# Patient Record
Sex: FEMALE | Race: WHITE
[De-identification: names, ages, dates, MRNs, and addresses within clinical notes are randomized per-mention and may not be internally consistent; named-entity substitution may affect disease eponyms.]

---

## 2017-06-06 ENCOUNTER — HOSPITAL ENCOUNTER (OUTPATIENT)
Dept: HOSPITAL 62 - WI | Age: 37
End: 2017-06-06
Attending: NURSE PRACTITIONER
Payer: COMMERCIAL

## 2017-06-06 DIAGNOSIS — M84.30XA: Primary | ICD-10-CM

## 2017-06-06 PROCEDURE — 77080 DXA BONE DENSITY AXIAL: CPT

## 2017-06-06 NOTE — WOMENS IMAGING REPORT
EXAM DESCRIPTION:  BONE DENSITY HIP/SPINE



COMPLETED DATE/TIME:  6/6/2017 2:19 pm



REASON FOR STUDY:  BONE DENSITY; M84.30 M84.30XA  STRESS FRACTURE, UNSPECIFIED SITE, INIT ENCNTR FOR



COMPARISON:   None.



TECHNIQUE:  Dual-Energy X-ray Absorptiometry (DEXA) of the AP Spine and Hip.



LIMITATIONS:  None.



FINDINGS:  LUMBAR SPINE:

The bone mineral density (BMD) measured from L1-L4 in the AP projection correlates with a T-score of 
-1.1, which is osteopenic as defined by the World Health Organization.

HIP:

The bone mineral density (BMD) measured in the left femoral neck at the hip correlates with a T-score
 of -1.2, which is osteopenic as defined by the World Health Organization.



IMPRESSION:  1. LUMBAR SPINE: Osteopenic

2. HIP: Osteopenic



COMMENT:  The World Health Organization defines low BMD as follows:

T-score:

Normal:  Greater than -1.0

Osteopenia: Between -1.0 and -2.5

Osteoporosis:  Less than -2.5 without fractures

Established osteoporosis:  Less than -2.5 with fractures

In general, you may wish to consider:

Diagnosis          Treatment                     Follow-up DEXA

Normal BMD      Prevention                    2-3 years

Osteopenia       Prevention/Therapy        1-2 years

Osteoporosis     Therapy                        Yearly



TECHNICAL DOCUMENTATION:  JOB ID:  [id number]

 [employer]- All Rights Reserved

## 2019-08-23 ENCOUNTER — HOSPITAL ENCOUNTER (OUTPATIENT)
Dept: HOSPITAL 62 - OD | Age: 39
End: 2019-08-23
Attending: PHYSICIAN ASSISTANT
Payer: COMMERCIAL

## 2019-08-23 DIAGNOSIS — M25.551: Primary | ICD-10-CM

## 2019-08-23 DIAGNOSIS — M25.552: ICD-10-CM

## 2019-08-23 DIAGNOSIS — L40.9: ICD-10-CM

## 2019-08-23 LAB
ADD MANUAL DIFF: NO
ALBUMIN SERPL-MCNC: 4.4 G/DL (ref 3.5–5)
ALP SERPL-CCNC: 49 U/L (ref 38–126)
ANION GAP SERPL CALC-SCNC: 12 MMOL/L (ref 5–19)
AST SERPL-CCNC: 27 U/L (ref 14–36)
BASOPHILS # BLD AUTO: 0 10^3/UL (ref 0–0.2)
BASOPHILS NFR BLD AUTO: 0.7 % (ref 0–2)
BILIRUB DIRECT SERPL-MCNC: 0.2 MG/DL (ref 0–0.4)
BILIRUB SERPL-MCNC: 0.2 MG/DL (ref 0.2–1.3)
BUN SERPL-MCNC: 7 MG/DL (ref 7–20)
CALCIUM: 9.9 MG/DL (ref 8.4–10.2)
CHLORIDE SERPL-SCNC: 100 MMOL/L (ref 98–107)
CO2 SERPL-SCNC: 27 MMOL/L (ref 22–30)
CRP SERPL-MCNC: < 5 MG/L (ref ?–10)
EOSINOPHIL # BLD AUTO: 0.1 10^3/UL (ref 0–0.6)
EOSINOPHIL NFR BLD AUTO: 1.4 % (ref 0–6)
ERYTHROCYTE [DISTWIDTH] IN BLOOD BY AUTOMATED COUNT: 14.8 % (ref 11.5–14)
ERYTHROCYTE [SEDIMENTATION RATE] IN BLOOD: 18 MM/HR (ref 0–20)
GLUCOSE SERPL-MCNC: 93 MG/DL (ref 75–110)
HCT VFR BLD CALC: 40 % (ref 36–47)
HGB BLD-MCNC: 13.5 G/DL (ref 12–15.5)
LYMPHOCYTES # BLD AUTO: 2.4 10^3/UL (ref 0.5–4.7)
LYMPHOCYTES NFR BLD AUTO: 32.9 % (ref 13–45)
MCH RBC QN AUTO: 29 PG (ref 27–33.4)
MCHC RBC AUTO-ENTMCNC: 33.6 G/DL (ref 32–36)
MCV RBC AUTO: 86 FL (ref 80–97)
MONOCYTES # BLD AUTO: 0.5 10^3/UL (ref 0.1–1.4)
MONOCYTES NFR BLD AUTO: 7 % (ref 3–13)
NEUTROPHILS # BLD AUTO: 4.2 10^3/UL (ref 1.7–8.2)
NEUTS SEG NFR BLD AUTO: 58 % (ref 42–78)
PLATELET # BLD: 385 10^3/UL (ref 150–450)
POTASSIUM SERPL-SCNC: 4.2 MMOL/L (ref 3.6–5)
PROT SERPL-MCNC: 7 G/DL (ref 6.3–8.2)
RBC # BLD AUTO: 4.64 10^6/UL (ref 3.72–5.28)
TOTAL CELLS COUNTED % (AUTO): 100 %
WBC # BLD AUTO: 7.2 10^3/UL (ref 4–10.5)

## 2019-08-23 PROCEDURE — 86430 RHEUMATOID FACTOR TEST QUAL: CPT

## 2019-08-23 PROCEDURE — 85025 COMPLETE CBC W/AUTO DIFF WBC: CPT

## 2019-08-23 PROCEDURE — 80053 COMPREHEN METABOLIC PANEL: CPT

## 2019-08-23 PROCEDURE — 85652 RBC SED RATE AUTOMATED: CPT

## 2019-08-23 PROCEDURE — 82652 VIT D 1 25-DIHYDROXY: CPT

## 2019-08-23 PROCEDURE — 86038 ANTINUCLEAR ANTIBODIES: CPT

## 2019-08-23 PROCEDURE — 86812 HLA TYPING A B OR C: CPT

## 2019-08-23 PROCEDURE — 36415 COLL VENOUS BLD VENIPUNCTURE: CPT

## 2019-08-23 PROCEDURE — 73522 X-RAY EXAM HIPS BI 3-4 VIEWS: CPT

## 2019-08-23 PROCEDURE — 84443 ASSAY THYROID STIM HORMONE: CPT

## 2019-08-23 PROCEDURE — 86140 C-REACTIVE PROTEIN: CPT

## 2019-08-23 PROCEDURE — 82607 VITAMIN B-12: CPT

## 2019-08-23 NOTE — RADIOLOGY REPORT (SQ)
EXAM DESCRIPTION:  HIPS BILATERAL



COMPLETED DATE/TIME:  8/23/2019 11:22 am



REASON FOR STUDY:  NATALIYA HIP PAIN M25.559  PAIN IN UNSPECIFIED HIP G89.4  CHRONIC PAIN SYNDROME L40.9  
PSORIASIS, UNSPECIFIED



COMPARISON:  None.



NUMBER OF VIEWS:  Two views



TECHNIQUE:  AP pelvis and additional frog-leg view of both hips.



LIMITATIONS:  None.



FINDINGS:  MINERALIZATION: Normal.

HIPS: No acute fracture or dislocation. No worrisome bone lesions.

PELVIS AND SACRUM:  No acute fracture or dislocation. No worrisome bone lesions.

PUBIS AND ISCHIUM: No acute fracture.

LOWER LUMBAR SPINE: No significant findings as visualized.

SOFT TISSUES: No findings.

OTHER: No other significant finding.



IMPRESSION:  NEGATIVE STUDY OF THE PELVIS AND HIPS.



TECHNICAL DOCUMENTATION:  JOB ID:  [id number]

 2011 [employer]- All Rights Reserved



Reading location - IP/workstation name: [email or web address]

## 2020-02-03 ENCOUNTER — HOSPITAL ENCOUNTER (OUTPATIENT)
Dept: HOSPITAL 62 - LC | Age: 40
Discharge: HOME | End: 2020-02-03
Attending: OBSTETRICS & GYNECOLOGY
Payer: COMMERCIAL

## 2020-02-03 DIAGNOSIS — Z3A.25: ICD-10-CM

## 2020-02-03 DIAGNOSIS — O21.0: Primary | ICD-10-CM

## 2020-02-03 LAB
APPEARANCE UR: CLEAR
APTT PPP: (no result) S
BARBITURATES UR QL SCN: NEGATIVE
BILIRUB UR QL STRIP: NEGATIVE
GLUCOSE UR STRIP-MCNC: NEGATIVE MG/DL
KETONES UR STRIP-MCNC: NEGATIVE MG/DL
METHADONE UR QL SCN: NEGATIVE
NITRITE UR QL STRIP: NEGATIVE
PCP UR QL SCN: NEGATIVE
PH UR STRIP: 7 [PH] (ref 5–9)
PROT UR STRIP-MCNC: NEGATIVE MG/DL
SP GR UR STRIP: 1
URINE AMPHETAMINES SCREEN: NEGATIVE
URINE BENZODIAZEPINES SCREEN: NEGATIVE
URINE COCAINE SCREEN: NEGATIVE
URINE MARIJUANA (THC) SCREEN: NEGATIVE
UROBILINOGEN UR-MCNC: NEGATIVE MG/DL (ref ?–2)

## 2020-02-03 PROCEDURE — 80307 DRUG TEST PRSMV CHEM ANLYZR: CPT

## 2020-02-03 PROCEDURE — 4A1HXCZ MONITORING OF PRODUCTS OF CONCEPTION, CARDIAC RATE, EXTERNAL APPROACH: ICD-10-PCS | Performed by: OBSTETRICS & GYNECOLOGY

## 2020-02-03 PROCEDURE — 81001 URINALYSIS AUTO W/SCOPE: CPT

## 2020-02-03 PROCEDURE — 59899 UNLISTED PX MAT CARE&DLVR: CPT

## 2020-02-03 PROCEDURE — G0480 DRUG TEST DEF 1-7 CLASSES: HCPCS

## 2020-02-03 PROCEDURE — 80349 CANNABINOIDS NATURAL: CPT

## 2020-05-04 LAB
ADD MANUAL DIFF: NO
APPEARANCE UR: CLEAR
APTT PPP: (no result) S
BARBITURATES UR QL SCN: NEGATIVE
BASOPHILS # BLD AUTO: 0 10^3/UL (ref 0–0.2)
BASOPHILS NFR BLD AUTO: 0.3 % (ref 0–2)
BILIRUB UR QL STRIP: NEGATIVE
EOSINOPHIL # BLD AUTO: 0 10^3/UL (ref 0–0.6)
EOSINOPHIL NFR BLD AUTO: 0.4 % (ref 0–6)
ERYTHROCYTE [DISTWIDTH] IN BLOOD BY AUTOMATED COUNT: 18.7 % (ref 11.5–14)
GLUCOSE UR STRIP-MCNC: NEGATIVE MG/DL
HCT VFR BLD CALC: 35.4 % (ref 36–47)
HGB BLD-MCNC: 12.1 G/DL (ref 12–15.5)
KETONES UR STRIP-MCNC: NEGATIVE MG/DL
LYMPHOCYTES # BLD AUTO: 1.9 10^3/UL (ref 0.5–4.7)
LYMPHOCYTES NFR BLD AUTO: 18.5 % (ref 13–45)
MCH RBC QN AUTO: 29.7 PG (ref 27–33.4)
MCHC RBC AUTO-ENTMCNC: 34.1 G/DL (ref 32–36)
MCV RBC AUTO: 87 FL (ref 80–97)
METHADONE UR QL SCN: NEGATIVE
MONOCYTES # BLD AUTO: 0.6 10^3/UL (ref 0.1–1.4)
MONOCYTES NFR BLD AUTO: 6.4 % (ref 3–13)
NEUTROPHILS # BLD AUTO: 7.5 10^3/UL (ref 1.7–8.2)
NEUTS SEG NFR BLD AUTO: 74.4 % (ref 42–78)
NITRITE UR QL STRIP: NEGATIVE
PCP UR QL SCN: NEGATIVE
PH UR STRIP: 7 [PH] (ref 5–9)
PLATELET # BLD: 353 10^3/UL (ref 150–450)
PROT UR STRIP-MCNC: NEGATIVE MG/DL
RBC # BLD AUTO: 4.06 10^6/UL (ref 3.72–5.28)
SP GR UR STRIP: 1.01
TOTAL CELLS COUNTED % (AUTO): 100 %
URINE AMPHETAMINES SCREEN: NEGATIVE
URINE BENZODIAZEPINES SCREEN: NEGATIVE
URINE COCAINE SCREEN: NEGATIVE
URINE MARIJUANA (THC) SCREEN: NEGATIVE
UROBILINOGEN UR-MCNC: NEGATIVE MG/DL (ref ?–2)
WBC # BLD AUTO: 10 10^3/UL (ref 4–10.5)

## 2020-05-06 ENCOUNTER — HOSPITAL ENCOUNTER (INPATIENT)
Dept: HOSPITAL 62 - 2S | Age: 40
LOS: 3 days | Discharge: HOME | End: 2020-05-09
Attending: OBSTETRICS & GYNECOLOGY | Admitting: OBSTETRICS & GYNECOLOGY
Payer: COMMERCIAL

## 2020-05-06 DIAGNOSIS — F41.8: ICD-10-CM

## 2020-05-06 DIAGNOSIS — N85.8: ICD-10-CM

## 2020-05-06 DIAGNOSIS — I25.10: ICD-10-CM

## 2020-05-06 DIAGNOSIS — M25.559: ICD-10-CM

## 2020-05-06 DIAGNOSIS — O34.211: Primary | ICD-10-CM

## 2020-05-06 DIAGNOSIS — F17.210: ICD-10-CM

## 2020-05-06 DIAGNOSIS — Z3A.39: ICD-10-CM

## 2020-05-06 DIAGNOSIS — Z30.2: ICD-10-CM

## 2020-05-06 PROCEDURE — 80307 DRUG TEST PRSMV CHEM ANLYZR: CPT

## 2020-05-06 PROCEDURE — 59025 FETAL NON-STRESS TEST: CPT

## 2020-05-06 PROCEDURE — 86901 BLOOD TYPING SEROLOGIC RH(D): CPT

## 2020-05-06 PROCEDURE — 0UL70CZ OCCLUSION OF BILATERAL FALLOPIAN TUBES WITH EXTRALUMINAL DEVICE, OPEN APPROACH: ICD-10-PCS | Performed by: OBSTETRICS & GYNECOLOGY

## 2020-05-06 PROCEDURE — C1765 ADHESION BARRIER: HCPCS

## 2020-05-06 PROCEDURE — 94799 UNLISTED PULMONARY SVC/PX: CPT

## 2020-05-06 PROCEDURE — 94760 N-INVAS EAR/PLS OXIMETRY 1: CPT

## 2020-05-06 PROCEDURE — 86900 BLOOD TYPING SEROLOGIC ABO: CPT

## 2020-05-06 PROCEDURE — 85025 COMPLETE CBC W/AUTO DIFF WBC: CPT

## 2020-05-06 PROCEDURE — 81001 URINALYSIS AUTO W/SCOPE: CPT

## 2020-05-06 PROCEDURE — 85027 COMPLETE CBC AUTOMATED: CPT

## 2020-05-06 PROCEDURE — 36415 COLL VENOUS BLD VENIPUNCTURE: CPT

## 2020-05-06 PROCEDURE — 86850 RBC ANTIBODY SCREEN: CPT

## 2020-05-06 RX ADMIN — OXYCODONE AND ACETAMINOPHEN PRN TAB: 5; 325 TABLET ORAL at 16:35

## 2020-05-06 RX ADMIN — OXYCODONE AND ACETAMINOPHEN PRN TAB: 5; 325 TABLET ORAL at 20:43

## 2020-05-06 RX ADMIN — KETOROLAC TROMETHAMINE SCH MG: 30 INJECTION, SOLUTION INTRAMUSCULAR at 18:21

## 2020-05-06 RX ADMIN — OXYCODONE AND ACETAMINOPHEN PRN TAB: 5; 325 TABLET ORAL at 11:30

## 2020-05-06 RX ADMIN — DOCUSATE SODIUM SCH MG: 100 CAPSULE, LIQUID FILLED ORAL at 18:21

## 2020-05-06 NOTE — OPERATIVE REPORT
Operative Report


DATE OF SURGERY: 20


PREOPERATIVE DIAGNOSIS: History of cesaren section x 1, undesired Fertility, , 39+0ega, AMA, CHTN, Chronic Hip pain, late PNC, h/o SVT


POSTOPERATIVE DIAGNOSIS: DEQUAN - delivered


OPERATION: Repeat  section, Scar revision, Bilateral tubal ligation with

filschie clips


SURGEON: MARTINE SCHMIDT


ANESTHESIA: Spinal


TISSUE REMOVED OR ALTERED: placenta and cord


COMPLICATIONS: 


pelvic adhesive disease


ESTIMATED BLOOD LOSS: 500


QUANTITATIVE BLOOD LOSS: 420


INTRAOPERATIVE FINDINGS: VMI delivered at 0816.  2020, Apgars 8/9.  Weight 

2660g (5#14oz).  QBL 420ml.  IVF 1500ml, UOP 150ml.  Adhesions of omentum to 

anterior uterine body.   Left fallopian tube scarred to left pelvic sidewall and

[facility] therefore Bilateral Filschie clip x 2 

placed.


PROCEDURE: 


Anesthesia provider: [Harish Joyner CRNA, MD]





Urine output: [150ml]





IV fluids: [1500ml]





Indications: [38yo  at 39+0ega presents for Repeat  and Bilateral

 Tubal Ligation.  Her pregnancy is complicated by AMA, history of SVT s/p 

cardiac ablation x 2, tobacco use, CHTN, hip pain (seeing pain management), late

 PNC.  She is 100% sure that she has completed childbearing.  The risks, 

benefits, alternatives were reviewed and she desires to proceed with planned 

procedure.  She has a history of midline vertical incision due to bowel injury 

after laparoscopic surgery and is aware that this history may increase her 

complication risks for her procedure.]





Procedure: The patient was taken to the operating room where spinal anesthesia 

was obtained and found to be adequate.  She was then prepped and draped in the 

normal sterile fashion and placed in the dorsal supine position with a leftward 

tilt.  A Pfannenstiel skin incision was then made and carried through to the 

underlying layers of the fascia with the scalpel.  The fascia was incised in the

midline and the incision extended laterally with the Vela scissors.  The 

superior aspect of the fascial incision was then grasped with Kocher clamps 

elevated and the underlying rectus muscles dissected off [bluntly].  Attention 

was then turned to the inferior aspect of the fascial incision which in a 

similar fashion was grasped, tented up with Kocher clamps, and the rectus 

muscles dissected off [bluntly].  The rectus muscles were then  in the 

midline and the peritoneum at the amount identified and entered [bluntly].  The 

peritoneal incision was then extended superiorly and inferiorly with good 

visualization of the bladder after dissection of adhesions noted to anterior 

fundus.  The bladder blade was inserted and the vesicouterine peritoneum 

identified grasped with [place] pickups and entered sharply with the Metzenbaum 

scissors.  This incision was then extended laterally with the Metzenbaum 

scissors and a bladder flap created digitally.  The bladder blade was then 

reinserted and the lower uterine segment incised in a transverse fashion with 

the scalpel.  The uterine incision was then extended bluntly.  The bladder blade

 was removed and the infant's head was delivered from cephalic presentation 

atraumatically.  The nose and mouth were suctioned and the cord doubly clamped 

and cut.  And the infant was handed off to waiting pediatricians.





The placenta was then delivered spontaneously and the uterus exteriorized and 

cleared of all clots and debris.  The uterine incision was then repaired with 1-

0 Vicryl in a running locked fashion.  A second layer of the same suture was 

used to obtain hemostasis via imbrication of the initial layer.  The bladder 

flap was then repaired with 3-0 chromic in a running fashion.  The left 

fallopian tube appeared adhesed to left pelvic sidewall and underlying 

structures and was unable to mobilize therefore Filschie clip placed and due to 

tension on the tube the clip slipped some therefore 2nd filschie clip placed.  

This procedure was performed on the patient's right to complete the bilateral 

tubal ligation.  The uterus was returned to the patient's abdomen and Interceed 

was placed overlying the uterine incision to prevent adhesions.  The gutters 

were cleared of all clots and debris.  All operative sites were noted to be 

hemostatic.  The fascia was reapproximated with 0 Vicryl in a running fashion 

from each lateral edge to the midline.  The skin was closed with 3-0 Monocryl in

 a running subcuticular fashion with overlying Dermabond for additional dressing

 as well as wound closure.  The patient tolerated the procedure well.  Sponge 

lap needle and instrument counts are correct times 2.  Clindamycin 900mg and 

Azithromycin 500mg given were given prior to skin incision.  The patient was 

taken to the recovery area awake and in stable condition.

## 2020-05-06 NOTE — BRIEF OPERATIVE NOTE
BRIEF OPERATIVE REPORT


DATE OF SURGERY: 20


TIME OF SURGERY: 08:00


PREOPERATIVE DIAGNOSIS: History of cesaren section x 1, undesired Fertility, 

, 39+0ega, AMA, CHTN, Chronic Hip pain, late PNC, h/o SVT


POSTOPERATIVE DIAGNOSIS: DEQUAN - delivered


SURGEON: MARTINE SCHMIDT


FINDINGS: VMI delivered at 0816.  2020, Apgars 8/9.  Weight 2660g (5#14oz). 

QBL 420ml.  IVF 1500ml, UOP 150ml.  Adhesions of omentum to anterior uterine 

body.   Left fallopian tube scarred to left pelvic sidewall and unable to 

mobilize for [facility] BTL therefore Bilateral Filschie clip x 2 placed.


COMPLICATIONS: 


None


ESTIMATED BLOOD LOSS: 500


TISSUE REMOVED OR ALTERED: placenta and cord


TECHNICAL PROCEDURE: Repeat cesarea section, Bilateral tubal ligation with 

filschie clips, Scar Revision

## 2020-05-06 NOTE — PDOC DELIVERY SUMMARY
Delivery Summary





- Maternal


Hx : II


Hx Para: I


Hx # Term Pregnancies: 1


Hx #  Pregnancies: 0


Hx Total # of Abortions (Sponateous & Elective): 0


Number of Living Children: 1


GRACY: 20


Gestational Age: 39


Prenatal Risk Factors: Previous 


Ruptured Membranes: AROM


Time of Rupture: 08:16


Fluids: Clear





- Delivery


Labor: Not In Labor


Presentation: Vertex


Fetal Heart Rate Monitoring: Done Pre-Operatively


Uterine Contraction Monitoring: External


Support Person Present: Yes


Location: OR


: Scheduled, Repeat


Placenta: Within Normal Limits


Placenta Description: normal


Number of Vessels (Cord): 3


Nuchal Cord: No


Delivery of Placenta Date: 20


Delivery of Placenta Time: 08:16


Estimated Blood Loss: 500


Delivery Quantitative Blood Loss (QBL): 420





- Medications


Type of Anesthesia:: Spinal





- Infant Assess and Care


  ** Baby 1 Male


Delivery of Infant Date: 20


Delivery of Infant Time: 08:16


Apgar at 1 minute: 8


Apgar at 5 minutes: 9


Preprinted Number On Band: [id number]


Infant Medical Record Number: [id number]


Skin to Skin: Yes


Skin to Skin (Mins): 2


To Nursery At: 08:24


Mode of Transport: Bassinet


Infant Delivery Weight: 2,660


Infant Delivery Length: 18.5 in





- Delivery Personnel


Nursery RN: MICHELL CORNELIUS


MD: MARTINE SCHMIDT

## 2020-05-07 LAB
ERYTHROCYTE [DISTWIDTH] IN BLOOD BY AUTOMATED COUNT: 18.5 % (ref 11.5–14)
HCT VFR BLD CALC: 28.2 % (ref 36–47)
HGB BLD-MCNC: 9.7 G/DL (ref 12–15.5)
MCH RBC QN AUTO: 29.6 PG (ref 27–33.4)
MCHC RBC AUTO-ENTMCNC: 34.4 G/DL (ref 32–36)
MCV RBC AUTO: 86 FL (ref 80–97)
PLATELET # BLD: 279 10^3/UL (ref 150–450)
RBC # BLD AUTO: 3.28 10^6/UL (ref 3.72–5.28)
WBC # BLD AUTO: 13.4 10^3/UL (ref 4–10.5)

## 2020-05-07 RX ADMIN — OXYCODONE AND ACETAMINOPHEN PRN TAB: 5; 325 TABLET ORAL at 00:49

## 2020-05-07 RX ADMIN — IBUPROFEN SCH MG: 800 TABLET, FILM COATED ORAL at 09:18

## 2020-05-07 RX ADMIN — Medication SCH CAP: at 09:17

## 2020-05-07 RX ADMIN — IBUPROFEN SCH MG: 800 TABLET, FILM COATED ORAL at 21:50

## 2020-05-07 RX ADMIN — DOCUSATE SODIUM SCH MG: 100 CAPSULE, LIQUID FILLED ORAL at 17:48

## 2020-05-07 RX ADMIN — OXYCODONE AND ACETAMINOPHEN PRN TAB: 5; 325 TABLET ORAL at 04:46

## 2020-05-07 RX ADMIN — OXYCODONE AND ACETAMINOPHEN PRN TAB: 5; 325 TABLET ORAL at 13:15

## 2020-05-07 RX ADMIN — KETOROLAC TROMETHAMINE SCH MG: 30 INJECTION, SOLUTION INTRAMUSCULAR at 02:07

## 2020-05-07 RX ADMIN — SERTRALINE HYDROCHLORIDE SCH MG: 50 TABLET ORAL at 21:50

## 2020-05-07 RX ADMIN — OXYCODONE AND ACETAMINOPHEN PRN TAB: 5; 325 TABLET ORAL at 17:48

## 2020-05-07 RX ADMIN — IBUPROFEN SCH MG: 800 TABLET, FILM COATED ORAL at 14:33

## 2020-05-07 RX ADMIN — DOCUSATE SODIUM SCH MG: 100 CAPSULE, LIQUID FILLED ORAL at 09:17

## 2020-05-07 RX ADMIN — OXYCODONE AND ACETAMINOPHEN PRN TAB: 5; 325 TABLET ORAL at 09:18

## 2020-05-07 RX ADMIN — OXYCODONE AND ACETAMINOPHEN PRN TAB: 5; 325 TABLET ORAL at 21:50

## 2020-05-07 NOTE — PDOC PROGRESS REPORT
Subjective-OB


Progress Note for:: 20


Subjective: 





Pt doing well. She reports light bleeding, +flatus and reg diet. Bleeding is 

light. 





Physical Exam (OB)


Vital Signs: 


                                        











Temp Pulse Resp BP Pulse Ox


 


 97.5 F   90   16   110/59 L  97 


 


 20 08:00  20 08:00  20 08:00  20 08:00  20 08:00








                                 Intake & Output











 20





 06:59 06:59 06:59


 


Intake Total  1700 600


 


Output Total  1800 


 


Balance  -100 600


 


Weight 86.183 kg  














- PIH/Pre-Eclampsia


DTR's: 1 +


Clonus: Negative


Headache: Absent


Epigastric Pain: No


Visual Changes: No





- 


Dressing Removed: No - no dressing


Incision: Open, Well Approximated


Closure Type: Surgical Glue





- Lochia


Lochia Amount: Small 10-25 ml


Lochia Color: Rubra/Red





- Abdomen


Description: Soft


Hernia Present: No


Fundal Description: Firm


Fundal Height: u/u - u/2





Objective-Diagnostic


Laboratory: 


                                        





                                 20 06:14 





                                        











  20





  06:14


 


WBC  13.4 H


 


RBC  3.28 L


 


Hgb  9.7 L D


 


Hct  28.2 L


 


MCV  86


 


MCH  29.6


 


MCHC  34.4


 


RDW  18.5 H


 


Plt Count  279














Assessment and Plan(PN)





- Assessment and Plan


(1) Advanced maternal age (AMA) in pregnancy


Is this a current diagnosis for this admission?: Yes   





(2) Late prenatal care affecting pregnancy


Qualifiers: 


   Trimester: first trimester   Qualified Code(s): O09.31 - Supervision of 

pregnancy with insufficient  care, first trimester   


Is this a current diagnosis for this admission?: Yes   





(3) Status post repeat low transverse  section


Is this a current diagnosis for this admission?: Yes   





(4) Sterilization


Is this a current diagnosis for this admission?: Yes   





- Time Spent with Patient


Time with patient: Less than 15 minutes


Medications reviewed and adjusted accordingly: Yes





- Disposition


Anticipated Discharge: Home


Within: within 24 hours

## 2020-05-08 RX ADMIN — IBUPROFEN SCH MG: 800 TABLET, FILM COATED ORAL at 02:32

## 2020-05-08 RX ADMIN — OXYCODONE AND ACETAMINOPHEN PRN TAB: 5; 325 TABLET ORAL at 07:47

## 2020-05-08 RX ADMIN — DOCUSATE SODIUM SCH MG: 100 CAPSULE, LIQUID FILLED ORAL at 09:10

## 2020-05-08 RX ADMIN — IBUPROFEN SCH MG: 800 TABLET, FILM COATED ORAL at 09:10

## 2020-05-08 RX ADMIN — IBUPROFEN SCH MG: 800 TABLET, FILM COATED ORAL at 14:07

## 2020-05-08 RX ADMIN — Medication SCH CAP: at 09:10

## 2020-05-08 RX ADMIN — OXYCODONE AND ACETAMINOPHEN PRN TAB: 5; 325 TABLET ORAL at 13:17

## 2020-05-08 RX ADMIN — OXYCODONE AND ACETAMINOPHEN PRN TAB: 5; 325 TABLET ORAL at 17:36

## 2020-05-08 RX ADMIN — OXYCODONE AND ACETAMINOPHEN PRN TAB: 5; 325 TABLET ORAL at 21:44

## 2020-05-08 RX ADMIN — SIMETHICONE PRN MG: 80 TABLET, CHEWABLE ORAL at 14:03

## 2020-05-08 RX ADMIN — DOCUSATE SODIUM SCH MG: 100 CAPSULE, LIQUID FILLED ORAL at 17:18

## 2020-05-08 RX ADMIN — IBUPROFEN SCH MG: 800 TABLET, FILM COATED ORAL at 21:43

## 2020-05-08 RX ADMIN — SERTRALINE HYDROCHLORIDE SCH MG: 50 TABLET ORAL at 21:43

## 2020-05-08 NOTE — PDOC PROGRESS REPORT
Subjective-OB


Progress Note for:: 20


Subjective: 





reports bleeding slowing, pain controlled with current meds. denies needs. 





Physical Exam (OB)


Vital Signs: 


                                        











Temp Pulse Resp BP Pulse Ox


 


 97.8 F   89   18   115/71   98 


 


 20 10:59  20 10:59  20 10:59  20 10:59  20 10:59








                                 Intake & Output











 20





 06:59 06:59 06:59


 


Intake Total 1700 1600 


 


Output Total 1800  


 


Balance -100 1600 














- 


Dressing Removed: No


Incision: Well Approximated


Closure Type: Surgical Glue





- Abdomen


Description: Tender, Soft


Hernia Present: No


Fundal Description: Firm, Midline


Fundal Height: u/u - u/2





- Abdominal


Distension: No distension





- Extremities


Lower extremities: Kavon's sign - neg


Calf: Normal, Nontender





Objective-Diagnostic


Laboratory: 


                                        





                                 20 06:14 











Assessment and Plan(PN)





- Time Spent with Patient


Time with patient: Less than 15 minutes


Medications reviewed and adjusted accordingly: Yes





- Disposition


Anticipated Discharge: Home

## 2020-05-09 VITALS — SYSTOLIC BLOOD PRESSURE: 115 MMHG | DIASTOLIC BLOOD PRESSURE: 64 MMHG

## 2020-05-09 RX ADMIN — SIMETHICONE PRN MG: 80 TABLET, CHEWABLE ORAL at 02:21

## 2020-05-09 RX ADMIN — IBUPROFEN SCH MG: 800 TABLET, FILM COATED ORAL at 02:21

## 2020-05-09 RX ADMIN — OXYCODONE AND ACETAMINOPHEN PRN TAB: 5; 325 TABLET ORAL at 02:22

## 2020-05-09 RX ADMIN — OXYCODONE AND ACETAMINOPHEN PRN TAB: 5; 325 TABLET ORAL at 11:10

## 2020-05-09 RX ADMIN — DOCUSATE SODIUM SCH MG: 100 CAPSULE, LIQUID FILLED ORAL at 09:49

## 2020-05-09 RX ADMIN — Medication SCH CAP: at 09:50

## 2020-05-09 RX ADMIN — IBUPROFEN SCH MG: 800 TABLET, FILM COATED ORAL at 09:50

## 2020-05-09 RX ADMIN — OXYCODONE AND ACETAMINOPHEN PRN TAB: 5; 325 TABLET ORAL at 06:29

## 2020-05-09 NOTE — PDOC DISCHARGE SUMMARY
Impression





- Admit/DC Date/PCP


Admission Date/Primary Care Provider: 


  05/06/20 05:31





  CANDIDA LEONG PA-C





Discharge Date: 05/09/20





- Additional Information


Resuscitation Status: Full Code


Discharge Diet: Regular


Discharge Activity: Balance Activity w/Rest, No Lifting Over 10 Pounds, No 

Lifting/Push/Pulling, Pelvic Rest, No tub bath


Referrals: 


CANDIDA LEONG PA-C [Primary Care Provider] - 


Prescriptions: 


Ibuprofen [Motrin 800 mg Tablet] 800 mg PO Q8HP PRN #60 tablet


 PRN Reason: 


Oxycodone HCl/Acetaminophen [Percocet 5-325 mg Tablet] 1 tab PO Q4HP PRN #30 

tablet


 PRN Reason: 


Sertraline HCl [Zoloft 50 mg Tablet] 100 mg PO QHS #30 tablet


Home Medications: 








Ibuprofen [Motrin 800 mg Tablet] 800 mg PO Q8HP PRN #60 tablet 05/09/20 


Oxycodone HCl/Acetaminophen [Percocet 5-325 mg Tablet] 1 tab PO Q4HP PRN #30 

tablet 05/09/20 


Sertraline HCl [Zoloft 50 mg Tablet] 100 mg PO QHS #30 tablet 05/09/20 











Results


Laboratory Results: 


                                        











WBC  13.4 10^3/uL (4.0-10.5)  H  05/07/20  06:14    


 


RBC  3.28 10^6/uL (3.72-5.28)  L  05/07/20  06:14    


 


Hgb  9.7 g/dL (12.0-15.5)  L D 05/07/20  06:14    


 


Hct  28.2 % (36.0-47.0)  L  05/07/20  06:14    


 


MCV  86 fl (80-97)   05/07/20  06:14    


 


MCH  29.6 pg (27.0-33.4)   05/07/20  06:14    


 


MCHC  34.4 g/dL (32.0-36.0)   05/07/20  06:14    


 


RDW  18.5 % (11.5-14.0)  H  05/07/20  06:14    


 


Plt Count  279 10^3/uL (150-450)   05/07/20  06:14    


 


Lymph % (Auto)  18.5 % (13-45)   05/04/20  15:05    


 


Mono % (Auto)  6.4 % (3-13)   05/04/20  15:05    


 


Eos % (Auto)  0.4 % (0-6)   05/04/20  15:05    


 


Baso % (Auto)  0.3 % (0-2)   05/04/20  15:05    


 


Absolute Neuts (auto)  7.5 10^3/uL (1.7-8.2)   05/04/20  15:05    


 


Absolute Lymphs (auto)  1.9 10^3/uL (0.5-4.7)   05/04/20  15:05    


 


Absolute Monos (auto)  0.6 10^3/uL (0.1-1.4)   05/04/20  15:05    


 


Absolute Eos (auto)  0.0 10^3/uL (0.0-0.6)   05/04/20  15:05    


 


Absolute Basos (auto)  0.0 10^3/uL (0.0-0.2)   05/04/20  15:05    


 


Seg Neutrophils %  74.4 % (42-78)   05/04/20  15:05    


 


Urine Color  STRAW   05/04/20  15:15    


 


Urine Appearance  CLEAR   05/04/20  15:15    


 


Urine pH  7.0  (5.0-9.0)   05/04/20  15:15    


 


Ur Specific Gravity  1.008   05/04/20  15:15    


 


Urine Protein  NEGATIVE mg/dL (NEGATIVE)   05/04/20  15:15    


 


Urine Glucose (UA)  NEGATIVE mg/dL (NEGATIVE)   05/04/20  15:15    


 


Urine Ketones  NEGATIVE mg/dL (NEGATIVE)   05/04/20  15:15    


 


Urine Blood  NEGATIVE  (NEGATIVE)   05/04/20  15:15    


 


Urine Nitrite  NEGATIVE  (NEGATIVE)   05/04/20  15:15    


 


Urine Bilirubin  NEGATIVE  (NEGATIVE)   05/04/20  15:15    


 


Urine Urobilinogen  NEGATIVE mg/dL (<2.0)   05/04/20  15:15    


 


Ur Leukocyte Esterase  NEGATIVE  (NEGATIVE)   05/04/20  15:15    


 


Urine WBC (Auto)  1 /HPF  05/04/20  15:15    


 


Urine RBC (Auto)  0 /HPF  05/04/20  15:15    


 


Urine Bacteria (Auto)  2+ /HPF  05/04/20  15:15    


 


Squamous Epi Cells Auto  3 /HPF  05/04/20  15:15    


 


Urine Mucus (Auto)  OCC /LPF  05/04/20  15:15    


 


Urine Ascorbic Acid  NEGATIVE  (NEGATIVE)   05/04/20  15:15    


 


Urine Opiates Screen  NEGATIVE   05/04/20  15:15    


 


Urine Methadone Screen  NEGATIVE   05/04/20  15:15    


 


Ur Barbiturates Screen  NEGATIVE   05/04/20  15:15    


 


Ur Phencyclidine Scrn  NEGATIVE   05/04/20  15:15    


 


Ur Amphetamines Screen  NEGATIVE   05/04/20  15:15    


 


U Benzodiazepines Scrn  NEGATIVE   05/04/20  15:15    


 


Urine Cocaine Screen  NEGATIVE   05/04/20  15:15    


 


U Marijuana (THC) Screen  NEGATIVE   05/04/20  15:15    


 


Blood Type  A POSITIVE   05/04/20  15:05    


 


Antibody Screen  NEGATIVE   05/04/20  15:05